# Patient Record
Sex: FEMALE | Race: BLACK OR AFRICAN AMERICAN | Employment: STUDENT | URBAN - METROPOLITAN AREA
[De-identification: names, ages, dates, MRNs, and addresses within clinical notes are randomized per-mention and may not be internally consistent; named-entity substitution may affect disease eponyms.]

---

## 2017-10-25 PROCEDURE — 87480 CANDIDA DNA DIR PROBE: CPT | Performed by: NURSE PRACTITIONER

## 2017-10-25 PROCEDURE — 87660 TRICHOMONAS VAGIN DIR PROBE: CPT | Performed by: NURSE PRACTITIONER

## 2017-10-25 PROCEDURE — 87491 CHLMYD TRACH DNA AMP PROBE: CPT | Performed by: NURSE PRACTITIONER

## 2017-10-25 PROCEDURE — 87591 N.GONORRHOEAE DNA AMP PROB: CPT | Performed by: NURSE PRACTITIONER

## 2017-10-25 PROCEDURE — 87510 GARDNER VAG DNA DIR PROBE: CPT | Performed by: NURSE PRACTITIONER

## 2018-05-20 ENCOUNTER — HOSPITAL ENCOUNTER (EMERGENCY)
Age: 23
Discharge: HOME | End: 2018-05-20
Attending: EMERGENCY MEDICINE
Payer: MEDICAID

## 2018-05-20 ENCOUNTER — APPOINTMENT (OUTPATIENT)
Dept: GENERAL RADIOLOGY | Age: 23
End: 2018-05-20
Attending: EMERGENCY MEDICINE
Payer: MEDICAID

## 2018-05-20 VITALS
BODY MASS INDEX: 27.32 KG/M2 | DIASTOLIC BLOOD PRESSURE: 83 MMHG | TEMPERATURE: 99 F | HEART RATE: 82 BPM | HEIGHT: 66 IN | SYSTOLIC BLOOD PRESSURE: 111 MMHG | RESPIRATION RATE: 16 BRPM | OXYGEN SATURATION: 99 % | WEIGHT: 170 LBS

## 2018-05-20 DIAGNOSIS — S16.1XXA STRAIN OF NECK MUSCLE, INITIAL ENCOUNTER: Primary | ICD-10-CM

## 2018-05-20 DIAGNOSIS — S39.012A BACK STRAIN, INITIAL ENCOUNTER: ICD-10-CM

## 2018-05-20 PROCEDURE — 99283 EMERGENCY DEPT VISIT LOW MDM: CPT

## 2018-05-20 PROCEDURE — 99284 EMERGENCY DEPT VISIT MOD MDM: CPT

## 2018-05-20 PROCEDURE — 72052 X-RAY EXAM NECK SPINE 6/>VWS: CPT | Performed by: EMERGENCY MEDICINE

## 2018-05-20 RX ORDER — IBUPROFEN 600 MG/1
600 TABLET ORAL ONCE
Status: COMPLETED | OUTPATIENT
Start: 2018-05-20 | End: 2018-05-20

## 2018-05-20 RX ORDER — ORPHENADRINE CITRATE 100 MG/1
100 TABLET, EXTENDED RELEASE ORAL 2 TIMES DAILY
Qty: 10 TABLET | Refills: 0 | Status: SHIPPED | OUTPATIENT
Start: 2018-05-20 | End: 2018-05-27

## 2018-05-20 RX ORDER — NAPROXEN 500 MG/1
500 TABLET ORAL 2 TIMES DAILY PRN
Qty: 20 TABLET | Refills: 0 | Status: SHIPPED | OUTPATIENT
Start: 2018-05-20 | End: 2018-05-27
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
## 2018-06-28 ENCOUNTER — HOSPITAL ENCOUNTER (EMERGENCY)
Age: 23
Discharge: LEFT BEFORE DISCHARGE | End: 2018-06-28
Attending: EMERGENCY MEDICINE
Payer: MEDICAID

## 2018-06-28 VITALS
TEMPERATURE: 97 F | HEART RATE: 100 BPM | RESPIRATION RATE: 20 BRPM | OXYGEN SATURATION: 99 % | SYSTOLIC BLOOD PRESSURE: 118 MMHG | WEIGHT: 169.75 LBS | BODY MASS INDEX: 27 KG/M2 | DIASTOLIC BLOOD PRESSURE: 76 MMHG

## 2018-06-28 PROCEDURE — 81001 URINALYSIS AUTO W/SCOPE: CPT | Performed by: EMERGENCY MEDICINE

## 2018-06-28 PROCEDURE — 87086 URINE CULTURE/COLONY COUNT: CPT | Performed by: EMERGENCY MEDICINE

## 2019-06-18 ENCOUNTER — HOSPITAL ENCOUNTER (OUTPATIENT)
Facility: HOSPITAL | Age: 24
Setting detail: OBSERVATION
Discharge: HOME | End: 2019-06-18
Attending: EMERGENCY MEDICINE | Admitting: OBSTETRICS & GYNECOLOGY
Payer: MEDICAID

## 2019-06-18 VITALS
DIASTOLIC BLOOD PRESSURE: 70 MMHG | BODY MASS INDEX: 32.78 KG/M2 | HEART RATE: 87 BPM | WEIGHT: 204 LBS | OXYGEN SATURATION: 99 % | TEMPERATURE: 97 F | HEIGHT: 66 IN | RESPIRATION RATE: 20 BRPM | SYSTOLIC BLOOD PRESSURE: 112 MMHG

## 2019-06-18 DIAGNOSIS — Z3A.28 28 WEEKS GESTATION OF PREGNANCY: ICD-10-CM

## 2019-06-18 DIAGNOSIS — R10.9 ABDOMINAL PAIN OF UNKNOWN ETIOLOGY: Primary | ICD-10-CM

## 2019-06-18 PROCEDURE — 99285 EMERGENCY DEPT VISIT HI MDM: CPT

## 2019-06-18 PROCEDURE — 99212 OFFICE O/P EST SF 10 MIN: CPT

## 2019-06-18 RX ORDER — CHOLECALCIFEROL (VITAMIN D3) 25 MCG
1 TABLET,CHEWABLE ORAL DAILY
COMMUNITY

## 2019-06-18 NOTE — ED INITIAL ASSESSMENT (HPI)
7 mths preg with abd tightness. Denies vaginal bleeding.  md at [facility].

## 2019-06-19 NOTE — PROGRESS NOTES
Pt to triage per ambulance from [facility]. States she has had flank pain on both sides for weeks but it was more consistant today, about once an hour.  No c/o leaking of fluid or bleeding or cramping, monitor applied to pt

## 2019-06-20 ENCOUNTER — OFFICE VISIT (OUTPATIENT)
Dept: MATERNAL FETAL MEDICINE | Age: 24
End: 2019-06-20

## 2019-06-20 DIAGNOSIS — Z34.90 PREGNANCY: ICD-10-CM

## 2019-06-20 DIAGNOSIS — Z34.90 PREGNANCY: Primary | ICD-10-CM

## 2019-06-20 DIAGNOSIS — Z36.3 SCREENING, ANTENATAL, FOR MALFORMATION BY ULTRASOUND: Primary | ICD-10-CM

## 2019-06-20 DIAGNOSIS — Z3A.29 29 WEEKS GESTATION OF PREGNANCY: ICD-10-CM

## 2019-06-20 PROCEDURE — 76805 OB US >/= 14 WKS SNGL FETUS: CPT | Performed by: OBSTETRICS & GYNECOLOGY

## 2019-08-19 ENCOUNTER — OFFICE VISIT (OUTPATIENT)
Dept: MATERNAL FETAL MEDICINE | Age: 24
End: 2019-08-19

## 2019-08-19 DIAGNOSIS — Z34.90 PREGNANCY: Primary | ICD-10-CM

## 2019-08-19 DIAGNOSIS — O09.30 LATE PRENATAL CARE: ICD-10-CM

## 2019-08-19 DIAGNOSIS — Z34.90 PREGNANCY: ICD-10-CM

## 2019-08-19 DIAGNOSIS — Z36.89 ENCOUNTER FOR ULTRASOUND TO ASSESS INTERVAL GROWTH OF FETUS: Primary | ICD-10-CM

## 2019-08-19 DIAGNOSIS — Z3A.38 38 WEEKS GESTATION OF PREGNANCY: ICD-10-CM

## 2019-08-19 PROCEDURE — 76816 OB US FOLLOW-UP PER FETUS: CPT | Performed by: OBSTETRICS & GYNECOLOGY

## 2019-08-21 ENCOUNTER — HOSPITAL (OUTPATIENT)
Dept: OTHER | Age: 24
End: 2019-08-21
Attending: OBSTETRICS & GYNECOLOGY

## 2019-08-22 ENCOUNTER — HOSPITAL (OUTPATIENT)
Dept: OTHER | Age: 24
End: 2019-08-22

## 2019-08-22 LAB
ABO + RH BLD: NORMAL
ABO + RH BLD: NORMAL
BLD GP AB SCN SERPL QL: NEGATIVE
CROSSMATCH EXPIRE: NORMAL

## 2021-11-30 ENCOUNTER — TELEPHONE (OUTPATIENT)
Dept: SCHEDULING | Age: 26
End: 2021-11-30

## 2024-09-08 NOTE — NST
Infectious Disease Progress Note      Date of admission: 9/5/2024 10:33 AM     Reason for consult: Question of discitis with epidural phlegmon    Subjective: Patient's back pain is improving.  No nausea or vomiting.  No diarrhea.  No shortness of breath.  No cough or sputum production.    The rest of the systems were reviewed and found to be negative except was mentioned above    Interval events: This is a 76-year-old male patient with history of end-stage renal disease on hemodialysis, liver cirrhosis, type 1 diabetes, who presents here with worsening back pain after falling down.  CT scan of the spine showing bony destruction of L5-S1 endplate suspicious for osteomyelitis with question of epidural phlegmon involving the same area  MRI of the lumbar spine is currently pending.  Blood cultures with no growth to date.    Antimicrobials:  Cefepime from 9/8 through today  IV vancomycin from 9/7 through today      Medications:    prednisoLONE    bacitracin-polymyxin b    moxifloxacin    cefepime    vancomycin    midodrine    insulin    sodium chloride **AND** albumin human    silver sulfADIAZINE    [Held by provider] aspirin    ezetimibe    gabapentin    lactulose    levothyroxine    sertraline    sevelamer carbonate    acetaminophen    HYDROcodone-acetaminophen    cyclobenzaprine    famotidine    melatonin    glucose **OR** glucose **OR** glucose-vitamin C **OR** dextrose **OR** glucose **OR** glucose **OR** glucose-vitamin C     Allergies:  Allergies   Allergen Reactions    Antihistamine & Nasal Deconges [Fexofenadine-Pseudoephedrine] OTHER (SEE COMMENTS)     Altered mental status    Adhesive Tape ITCHING    Albuterol DIZZINESS and OTHER (SEE COMMENTS)     Albuterol Inhalation. Lightheadedness    Benadryl [Diphenhydramine] RESTLESSNESS    Depakote [Valproic Acid] DIZZINESS    Fluconazole OTHER (SEE COMMENTS)     Kidney labs abnormal    Mirtazapine RESTLESSNESS    Quetiapine RESTLESSNESS    Wellbutrin [Bupropion]  Nonstress Test   Patient: Dangelo Wood    Gestation: 29w3d    NST:       Variability: Moderate           Accelerations: Yes           Decelerations: None            Baseline: 140 BPM           Uterine Irritability: No           Contractions: Not present RASH    Latex RASH       Physical Exam:  Vitals:    09/08/24 0850   BP: 111/57   Pulse: 63   Resp: 16   Temp: 98.7 °F (37.1 °C)     Vitals signs and nursing note reviewed.   Constitutional:       Appearance: Normal appearance.   HENT:      Head: Normocephalic and atraumatic.      Mouth/Throat: Normal dentition     Mouth: Mucous membranes are moist.   Neck:      Musculoskeletal: Neck supple.   Cardiovascular:      Rate and Rhythm: Normal rate.      Heart sounds: Normal heart sounds. No murmur. No friction rub. No gallop.    Pulmonary:      Effort: Pulmonary effort is normal. No respiratory distress.      Breath sounds: Normal breath sounds. No stridor. No wheezing, rhonchi or rales.   Chest:      Chest wall: No tenderness.   Abdominal:      General: Abdomen is flat. There is no distension.      Palpations: Abdomen is soft. There is no mass.      Tenderness: There is no tenderness. There is no guarding or rebound.      Hernia: No hernia is present.   Musculoskeletal:      Right lower leg: No edema.      Left lower leg: No edema.   Skin:     General: Skin is warm and dry.   Neurological:      General: No focal deficit present.      Mental Status: Alert and oriented to person, place, and time.       Laboratory data:  I have reviewed all the lab results independently.  Lab Results   Component Value Date    WBC 6.1 09/08/2024    HGB 10.3 09/08/2024    HCT 34.2 09/08/2024    .0 09/08/2024    CREATSERUM 3.88 09/08/2024    BUN 30 09/08/2024     09/08/2024    K 4.4 09/08/2024     09/08/2024    CO2 25.0 09/08/2024     09/08/2024    CA 9.4 09/08/2024    MG 2.3 09/08/2024      Recent Labs   Lab 09/05/24  1116 09/06/24  0448 09/08/24  0540   RBC 3.44*   < > 3.31*   HGB 10.7*   < > 10.3*   HCT 34.3*   < > 34.2*   MCV 99.7   < > 103.3*   MCH 31.1   < > 31.1   MCHC 31.2   < > 30.1*   RDW 19.6   < > 20.0   NEPRELIM 5.21  --   --    WBC 6.3   < > 6.1   .0   < > 201.0    < > = values in this interval  not displayed.      Microbiology data:  Hospital Encounter on 09/05/24   1. Blood Culture     Status: None (Preliminary result)    Collection Time: 09/06/24 10:13 AM    Specimen: Blood,peripheral   Result Value Ref Range    Blood Culture Result No Growth 1 Day N/A        Radiology:  I have reviewed all imagining data available independently.   CT of the lumbar spine on 9/6/2024:  Bony destruction of L5-S1 endplates suspicious for osteomyelitis.  Question of L5-S1 epidural phlegmon     Impression:  Gamaliel Boyer is a 76 year old male with    Traumatic back pain after a fall from his wheelchair  CT concerning for osteomyelitis with epidural phlegmon; however, clinical picture is not consistent with an infectious process  The patient denies any fevers or chills.  Up until his fall, the patient was not having any worsening back pain outside of his chronic pain  MRI of the lumbosacral spine is currently pending  Blood cultures with no growth to date  Currently on IV cefepime and IV vancomycin given the concern for an epidural phlegmon  Antimicrobials:  IV cefepime from 9/7 through today  IV vancomycin from 9/7 through today  End stage renal disease  On hemodialysis  Antibiotics will be renally adjusted  Type 1 diabetes  As per the primary team  Reported allergy to fluconazole  Develops electrolyte abnormalities    Recommendations:    Follow-up on MRI results  Continue IV cefepime IV vancomycin  If MRI shows features consistent with an infectious process, recommend IR consultation for biopsy and cultures  Continue to monitor daily labs for antibiotic toxicities  Further recommendations will depend on the above work-up and clinical progress     The plan of care was discussed with the primary hospital team, Nikunj Kong MD     Recommendations were also discussed with the patient; all questions were answered.     Thank you for this consultation. Please don't hesitate to call the ID team for questions or any  acute changes in patient's clinical condition.    Please note that this report has been produced using speech recognition software and may contain errors related to that system including, but not limited to, errors in grammar, punctuation, and spelling, as well as words and phrases that possibly may have been recognized inappropriately.  If there are any questions or concerns, contact the dictating provider for clarification.    The  [employer] Cures Act makes medical notes like these available to patients in the interest of transparency. Please be advised this is a medical document. Medical documents are intended to carry relevant information, facts as evident, and the clinical opinion of the practitioner. The medical note is intended as peer to peer communication and may appear blunt or direct. It is written in medical language and may contain abbreviations or verbiage that are unfamiliar.     Taryn Ibanez MD  [facility]. Tel: [phone]. Fax: [phone].     Gamaliel Boyer : 1947 MRN: [id number] [facility]: [id number]

## (undated) NOTE — ED AVS SNAPSHOT
Wai Tiwari   MRN: [id number]    Department:  THE [facility] Department in [place]   Date of Visit:  5/20/2018           Disclosure     Insurance plans vary and the physician(s) referred by the ER may not be covered by your plan.  Please contact y tell this physician (or your personal doctor if your instructions are to return to your personal doctor) about any new or lasting problems. The primary care or specialist physician will see patients referred from the [facility] Emergency Department.  Philippe Hurley